# Patient Record
Sex: FEMALE | Race: BLACK OR AFRICAN AMERICAN
[De-identification: names, ages, dates, MRNs, and addresses within clinical notes are randomized per-mention and may not be internally consistent; named-entity substitution may affect disease eponyms.]

---

## 2021-04-13 ENCOUNTER — HOSPITAL ENCOUNTER (OUTPATIENT)
Dept: HOSPITAL 76 - DI.N | Age: 46
Discharge: HOME | End: 2021-04-13
Attending: NURSE PRACTITIONER
Payer: OTHER GOVERNMENT

## 2021-04-13 DIAGNOSIS — Z12.31: Primary | ICD-10-CM

## 2021-04-14 NOTE — MAMMOGRAPHY REPORT
BILATERAL DIGITAL SCREENING MAMMOGRAM 3D/2D: 4/13/2021

 

CLINICAL: Routine screening.  

 

Comparison is made to exam dated:  7/15/2013 mammogram - [facility].  The tissue of
 both breasts is heterogeneously dense. This may lower the sensitivity of mammography.  

 

No significant masses, calcifications, or other findings are seen in either breast.  

There has been no significant interval change.

 

IMPRESSION: NEGATIVE

There is no mammographic evidence of malignancy. A 1 year screening mammogram is recommended.  

 

 

This exam was interpreted at Station ID: [id number].  

 

NOTE: For mammograms, a report in lay terms will be sent to the patient. Approximately 15% of breast 
malignancies will not be visualized mammographically. In the management of a palpable breast mass, a 
negative mammogram must not discourage biopsy of a clinically suspicious lesion.

 

Electronically Signed By: Ayaz Alan          

acr/penrad:4/13/2021 10:03:20  

 

 

 

ACR BI-RADS Category 1: Negative 3341F

PARENCHYMAL PATTERN: (D) - The breast(s) demonstrate(s) heterogeneously dense fibroglandular niles bhagat.

BI-RADS CATEGORY: (1) - 1

RECOMMENDATION: (ANNUAL)  - Recommend routine annual screening mammography.

[id number]

1 year screening

LATERALITY: (B)

## 2022-10-12 ENCOUNTER — HOSPITAL ENCOUNTER (OUTPATIENT)
Dept: HOSPITAL 76 - DI.N | Age: 47
Discharge: HOME | End: 2022-10-12
Attending: NURSE PRACTITIONER
Payer: OTHER GOVERNMENT

## 2022-10-12 DIAGNOSIS — Z12.31: Primary | ICD-10-CM

## 2022-10-13 NOTE — MAMMOGRAPHY REPORT
BILATERAL DIGITAL SCREENING MAMMOGRAM 3D/2D: 10/12/2022

 

CLINICAL: Routine screening.  

 

Comparison is made to exams dated:  4/13/2021 mammogram and 7/15/2013 mammogram - [facility].  

 

There are scattered areas of fibroglandular density in both breasts (category b / 25%-50% glandular t
issue).  

 

No significant masses, calcifications, or other findings are seen in either breast.  

There has been no significant interval change.

 

IMPRESSION: NEGATIVE

There is no mammographic evidence of malignancy. A 1 year screening mammogram is recommended.  

 

Based on the Tyrer Cuzick model (a risk assessment model) the patients lifetime risk is 6.2% and her
 10 year risk is 1.4%. According to the ACR, ACS, and NCCN guidelines, an annual breast MRI exam ary
g with mammogram is recommended if the patients lifetime risk is 20% or greater.

 

 

This exam was interpreted at Station ID: [id number].  

 

NOTE: For mammograms, a report in lay terms will be sent to the patient. Approximately 15% of breast 
malignancies will not be visualized mammographically. In the management of a palpable breast mass, a 
negative mammogram must not discourage biopsy of a clinically suspicious lesion.

 

Electronically Signed By: Nic Choi M.D.          

atbeth/penrad:10/12/2022 17:47:03  

 

 

 

ACR BI-RADS Category 1: Negative 3341F

PARENCHYMAL PATTERN: (A) - The breast(s) demonstrate(s) scattered fibroglandular densities.

BI-RADS CATEGORY: (1) - 1

RECOMMENDATION: (ANNUAL)  - Recommend routine annual screening mammography.

[id number]

1 year screening

LATERALITY: (B)

## 2024-07-17 ENCOUNTER — HOSPITAL ENCOUNTER (OUTPATIENT)
Dept: HOSPITAL 76 - DI.N | Age: 49
Discharge: HOME | End: 2024-07-17
Payer: OTHER GOVERNMENT

## 2024-07-17 DIAGNOSIS — R92.323: ICD-10-CM

## 2024-07-17 DIAGNOSIS — Z12.31: Primary | ICD-10-CM

## 2024-07-23 NOTE — MAMMOGRAPHY REPORT
BILATERAL DIGITAL SCREENING MAMMOGRAM 3D/2D: 7/17/2024

 

CLINICAL: Routine screening.  

 

Comparison is made to exams dated:  10/12/2022 mammogram, 4/13/2021 mammogram, and 7/15/2013 mammogra
m - [facility].  

 

There are scattered areas of fibroglandular density in both breasts (category b / 25%-50% glandular t
issue).  

 

No significant masses, calcifications, or other findings are seen in either breast.  

There has been no significant interval change.

 

IMPRESSION: NEGATIVE

There is no mammographic evidence of malignancy. A 1 year screening mammogram is recommended.  

 

Based on the Tyrer Cuzick model (a risk assessment model) the patient's lifetime risk is 6.2% and her
 10 year risk is 1.4%. According to the ACR, ACS, and NCCN guidelines, an annual breast MRI exam ary
g with mammogram is recommended if the patient's lifetime risk is 20% or greater.

 

 

This exam was interpreted at Station ID: [id number].  

 

NOTE: For mammograms, a report in lay terms will be sent to the patient. Approximately 15% of breast 
malignancies will not be visualized mammographically. In the management of a palpable breast mass, a 
negative mammogram must not discourage biopsy of a clinically suspicious lesion.

 

Electronically Signed By: Daniel catalan/paul:7/23/2024 09:40:10  

 

 

letter sent: No_Letter  

ACR BI-RADS Category 1: Negative 3341F

PARENCHYMAL PATTERN: (A) - The breast(s) demonstrate(s) scattered fibroglandular densities.

BI-RADS CATEGORY: (1) - 1

RECOMMENDATION: (ANNUAL)  - Recommend routine annual screening mammography.

[id number]

1 year screening

LATERALITY: (B)